# Patient Record
Sex: MALE | Race: BLACK OR AFRICAN AMERICAN | NOT HISPANIC OR LATINO | Employment: PART TIME | RURAL
[De-identification: names, ages, dates, MRNs, and addresses within clinical notes are randomized per-mention and may not be internally consistent; named-entity substitution may affect disease eponyms.]

---

## 2017-04-21 ENCOUNTER — CONVERSION ENCOUNTER (OUTPATIENT)
Dept: FAMILY MEDICINE CLINIC | Facility: CLINIC | Age: 32
End: 2017-04-21

## 2017-04-22 LAB
ALBUMIN SERPL-MCNC: 4.7 G/DL (ref 3.6–5.1)
ALP SERPL-CCNC: 55 U/L (ref 40–115)
AST SERPL-CCNC: 20 U/L (ref 10–40)
BILIRUB SERPL-MCNC: 0.5 MG/DL (ref 0.2–1.2)
BUN SERPL-MCNC: 9 MG/DL (ref 7–25)
BUN/CREAT SERPL: 9 (CALC) (ref 6–22)
CALCIUM SERPL-MCNC: 10.2 MG/DL (ref 8.6–10.2)
CHLORIDE SERPL-SCNC: 102 MMOL/L (ref 98–110)
CHOLEST SERPL-MCNC: 238 MG/DL (ref 125–200)
CONV CO2: 29 MMOL/L (ref 21–33)
CONV TOTAL PROTEIN: 8.2 G/DL (ref 6.2–8.3)
CREAT UR-MCNC: 1 MG/DL (ref 0.79–1.33)
EOSINOPHIL # BLD AUTO: 100 CELLS/UL (ref 15–500)
ERYTHROCYTE [DISTWIDTH] IN BLOOD BY AUTOMATED COUNT: 13.8 % (ref 11–15)
GLOBULIN UR ELPH-MCNC: 3.5 MG/DL (ref 2.1–3.7)
GLUCOSE UR QL: 94 MG/DL (ref 65–99)
HCT VFR BLD AUTO: 49.1 % (ref 38.5–50)
HDLC SERPL-MCNC: 45 MG/DL
HGB BLD-MCNC: 16.2 G/DL (ref 13.2–17.1)
INSULIN SERPL-ACNC: 1.3 (CALC) (ref 1–2.1)
LDLC SERPL CALC-MCNC: 169 MG/DL
LYMPHOCYTES # BLD AUTO: 2000 CELLS/UL (ref 850–3900)
LYMPHOCYTES NFR BLD AUTO: 39 %
MCH RBC QN AUTO: 26.2 PG (ref 27–33)
MCHC RBC AUTO-ENTMCNC: 33.1 G/DL (ref 32–36)
MCV RBC AUTO: 79 FL (ref 80–100)
MONOCYTES # BLD AUTO: 400 CELLS/UL (ref 200–950)
MONOCYTES NFR BLD AUTO: 9 %
NEUTROPHILS # BLD AUTO: 2500 CELLS/UL (ref 1500–7800)
NEUTROPHILS NFR BLD AUTO: 50 %
PLATELET # BLD AUTO: 197 10*3/UL (ref 140–400)
PMV BLD AUTO: 9.8 FL (ref 7.5–11.5)
POTASSIUM SERPL-SCNC: 4.4 MMOL/L (ref 3.5–5.3)
RBC # BLD AUTO: 6.21 MILLION/UL (ref 4.2–5.8)
SODIUM SERPL-SCNC: 138 MMOL/L (ref 135–146)
TRIGL SERPL-MCNC: 118 MG/DL
TSH SERPL-ACNC: 2.56 MIU/L (ref 0.4–4.5)
WBC # BLD AUTO: 5 10*3/UL (ref 3.8–10.8)

## 2019-02-12 ENCOUNTER — CONVERSION ENCOUNTER (OUTPATIENT)
Dept: FAMILY MEDICINE CLINIC | Facility: CLINIC | Age: 34
End: 2019-02-12

## 2019-02-13 LAB
ALBUMIN SERPL-MCNC: 5.1 G/DL (ref 3.6–5.1)
ALP SERPL-CCNC: 58 U/L (ref 40–115)
ALT SERPL-CCNC: 14 U/L (ref 9–46)
AST SERPL-CCNC: 19 U/L (ref 10–40)
BASOPHILS # BLD AUTO: 22 CELLS/UL (ref 0–200)
BASOPHILS NFR BLD AUTO: 0.5 %
BILIRUB SERPL-MCNC: 0.7 MG/DL (ref 0.2–1.2)
BUN SERPL-MCNC: 10 MG/DL (ref 7–25)
BUN/CREAT SERPL: ABNORMAL (CALC) (ref 6–22)
CALCIUM SERPL-MCNC: 10.3 MG/DL (ref 8.6–10.3)
CHLORIDE SERPL-SCNC: 101 MMOL/L (ref 98–110)
CHOLEST SERPL-MCNC: 195 MG/DL
CHOLEST/HDLC SERPL: 4.6 (CALC)
CONV CO2: 33 MMOL/L (ref 20–32)
CONV TOTAL PROTEIN: 8.3 G/DL (ref 6.1–8.1)
CREAT UR-MCNC: 0.94 MG/DL (ref 0.6–1.35)
EOSINOPHIL # BLD AUTO: 0.9 %
EOSINOPHIL # BLD AUTO: 40 CELLS/UL (ref 15–500)
ERYTHROCYTE [DISTWIDTH] IN BLOOD BY AUTOMATED COUNT: 13.4 % (ref 11–15)
GLOBULIN UR ELPH-MCNC: 3.2 MG/DL (ref 1.9–3.7)
GLUCOSE UR QL: 79 MG/DL (ref 65–99)
HCT VFR BLD AUTO: 46.4 % (ref 38.5–50)
HDLC SERPL-MCNC: 42 MG/DL
HGB BLD-MCNC: 15.4 G/DL (ref 13.2–17.1)
INSULIN SERPL-ACNC: 1.6 (CALC) (ref 1–2.5)
LDLC SERPL CALC-MCNC: 136 MG/DL
LYMPHOCYTES # BLD AUTO: 2015 CELLS/UL (ref 850–3900)
LYMPHOCYTES NFR BLD AUTO: 45.8 %
MCH RBC QN AUTO: 26.7 PG (ref 27–33)
MCHC RBC AUTO-ENTMCNC: 33.2 G/DL (ref 32–36)
MCV RBC AUTO: 80.4 FL (ref 80–100)
MONOCYTES # BLD AUTO: 392 CELLS/UL (ref 200–950)
MONOCYTES NFR BLD AUTO: 8.9 %
NEUTROPHILS # BLD AUTO: 1932 CELLS/UL (ref 1500–7800)
NEUTROPHILS NFR BLD AUTO: 43.9 %
NONHDLC SERPL-MCNC: 153 MG/DL
PLATELET # BLD AUTO: 168 10*3/UL (ref 140–400)
PMV BLD AUTO: 13.2 FL (ref 7.5–12.5)
POTASSIUM SERPL-SCNC: 4.2 MMOL/L (ref 3.5–5.3)
RBC # BLD AUTO: 5.77 MILLION/UL (ref 4.2–5.8)
SODIUM SERPL-SCNC: 139 MMOL/L (ref 135–146)
TRIGL SERPL-MCNC: 71 MG/DL
WBC # BLD AUTO: 4.4 10*3/UL (ref 3.8–10.8)

## 2019-02-19 ENCOUNTER — HOSPITAL ENCOUNTER (OUTPATIENT)
Dept: OTHER | Facility: HOSPITAL | Age: 34
Discharge: HOME | End: 2019-02-19
Attending: FAMILY MEDICINE | Admitting: FAMILY MEDICINE

## 2019-05-28 ENCOUNTER — CONVERSION ENCOUNTER (OUTPATIENT)
Dept: FAMILY MEDICINE CLINIC | Facility: CLINIC | Age: 34
End: 2019-05-28

## 2019-05-29 LAB
ALBUMIN SERPL-MCNC: 4.7 G/DL (ref 3.6–5.1)
ALP SERPL-CCNC: 63 U/L (ref 40–115)
ALT SERPL-CCNC: 10 U/L (ref 9–46)
AST SERPL-CCNC: 16 U/L (ref 10–40)
BASOPHILS # BLD AUTO: 21 CELLS/UL (ref 0–200)
BASOPHILS NFR BLD AUTO: 0.7 %
BILIRUB SERPL-MCNC: 0.8 MG/DL (ref 0.2–1.2)
BUN SERPL-MCNC: 8 MG/DL (ref 7–25)
BUN/CREAT SERPL: NORMAL (CALC) (ref 6–22)
CALCIUM SERPL-MCNC: 9.7 MG/DL (ref 8.6–10.3)
CHLORIDE SERPL-SCNC: 102 MMOL/L (ref 98–110)
CHOLEST SERPL-MCNC: 168 MG/DL
CHOLEST/HDLC SERPL: 4.1 (CALC)
CONV CO2: 31 MMOL/L (ref 20–32)
CONV TOTAL PROTEIN: 7.7 G/DL (ref 6.1–8.1)
CREAT UR-MCNC: 0.98 MG/DL (ref 0.6–1.35)
EOSINOPHIL # BLD AUTO: 1.3 %
EOSINOPHIL # BLD AUTO: 39 CELLS/UL (ref 15–500)
ERYTHROCYTE [DISTWIDTH] IN BLOOD BY AUTOMATED COUNT: 13.4 % (ref 11–15)
GLOBULIN UR ELPH-MCNC: 3 MG/DL (ref 1.9–3.7)
GLUCOSE UR QL: 85 MG/DL (ref 65–99)
HCT VFR BLD AUTO: 44.3 % (ref 38.5–50)
HDLC SERPL-MCNC: 41 MG/DL
HGB BLD-MCNC: 14.4 G/DL (ref 13.2–17.1)
INSULIN SERPL-ACNC: 1.6 (CALC) (ref 1–2.5)
LDLC SERPL CALC-MCNC: 109 MG/DL
LYMPHOCYTES # BLD AUTO: 1347 CELLS/UL (ref 850–3900)
LYMPHOCYTES NFR BLD AUTO: 44.9 %
MCH RBC QN AUTO: 26.3 PG (ref 27–33)
MCHC RBC AUTO-ENTMCNC: 32.5 G/DL (ref 32–36)
MCV RBC AUTO: 80.8 FL (ref 80–100)
MONOCYTES # BLD AUTO: 300 CELLS/UL (ref 200–950)
MONOCYTES NFR BLD AUTO: 10 %
NEUTROPHILS # BLD AUTO: 1293 CELLS/UL (ref 1500–7800)
NEUTROPHILS NFR BLD AUTO: 43.1 %
NONHDLC SERPL-MCNC: 127 MG/DL
PLATELET # BLD AUTO: 185 10*3/UL (ref 140–400)
PMV BLD AUTO: 12.1 FL (ref 7.5–12.5)
POTASSIUM SERPL-SCNC: 3.8 MMOL/L (ref 3.5–5.3)
RBC # BLD AUTO: 5.48 MILLION/UL (ref 4.2–5.8)
SODIUM SERPL-SCNC: 139 MMOL/L (ref 135–146)
TRIGL SERPL-MCNC: 90 MG/DL
WBC # BLD AUTO: 3 10*3/UL (ref 3.8–10.8)

## 2019-06-25 ENCOUNTER — CONSULT (OUTPATIENT)
Dept: ONCOLOGY | Facility: CLINIC | Age: 34
End: 2019-06-25

## 2019-06-25 ENCOUNTER — LAB (OUTPATIENT)
Dept: LAB | Facility: HOSPITAL | Age: 34
End: 2019-06-25

## 2019-06-25 VITALS
WEIGHT: 140.6 LBS | TEMPERATURE: 97.7 F | RESPIRATION RATE: 18 BRPM | DIASTOLIC BLOOD PRESSURE: 84 MMHG | SYSTOLIC BLOOD PRESSURE: 144 MMHG | HEIGHT: 68 IN | HEART RATE: 59 BPM | BODY MASS INDEX: 21.31 KG/M2

## 2019-06-25 DIAGNOSIS — R06.00 DYSPNEA, UNSPECIFIED TYPE: ICD-10-CM

## 2019-06-25 DIAGNOSIS — D70.8 OTHER NEUTROPENIA (HCC): Primary | ICD-10-CM

## 2019-06-25 DIAGNOSIS — D70.8 OTHER NEUTROPENIA (HCC): ICD-10-CM

## 2019-06-25 DIAGNOSIS — R22.2 SUPRACLAVICULAR FOSSA FULLNESS: ICD-10-CM

## 2019-06-25 PROBLEM — R06.02 SHORTNESS OF BREATH: Status: ACTIVE | Noted: 2019-06-25

## 2019-06-25 LAB
ALBUMIN SERPL-MCNC: 4.8 G/DL (ref 3.5–4.8)
ALBUMIN/GLOB SERPL: 1.7 G/DL (ref 1–1.7)
ALP SERPL-CCNC: 53 U/L (ref 32–91)
ALT SERPL W P-5'-P-CCNC: 11 U/L (ref 17–63)
ANION GAP SERPL CALCULATED.3IONS-SCNC: 10 MMOL/L (ref 10–20)
AST SERPL-CCNC: 18 U/L (ref 15–41)
BASOPHILS # BLD AUTO: 0.02 10*3/MM3 (ref 0–0.2)
BASOPHILS NFR BLD AUTO: 0.5 % (ref 0–1.5)
BILIRUB SERPL-MCNC: 0.7 MG/DL (ref 0.3–1.2)
BUN BLD-MCNC: 6 MG/DL (ref 8–20)
BUN/CREAT SERPL: 6.7 (ref 6.2–20.3)
CALCIUM SPEC-SCNC: 9.5 MG/DL (ref 8.9–10.3)
CHLORIDE SERPL-SCNC: 102 MMOL/L (ref 101–111)
CO2 SERPL-SCNC: 26 MMOL/L (ref 22–32)
CREAT BLD-MCNC: 0.9 MG/DL (ref 0.7–1.2)
DEPRECATED RDW RBC AUTO: 36.5 FL (ref 37–54)
EOSINOPHIL # BLD AUTO: 0.04 10*3/MM3 (ref 0–0.4)
EOSINOPHIL NFR BLD AUTO: 0.9 % (ref 0.3–6.2)
ERYTHROCYTE [DISTWIDTH] IN BLOOD BY AUTOMATED COUNT: 12.7 % (ref 12.3–15.4)
FERRITIN SERPL-MCNC: 90 NG/ML (ref 24–336)
FOLATE SERPL-MCNC: 17 NG/ML (ref 5.9–24.8)
GFR SERPL CREATININE-BSD FRML MDRD: 118 ML/MIN/1.73
GLOBULIN UR ELPH-MCNC: 2.9 GM/DL (ref 2.5–3.8)
GLUCOSE BLD-MCNC: 76 MG/DL (ref 65–99)
HCT VFR BLD AUTO: 41.5 % (ref 37.5–51)
HGB BLD-MCNC: 13.9 G/DL (ref 13–17.7)
LDH SERPL-CCNC: 93 U/L (ref 98–192)
LYMPHOCYTES # BLD AUTO: 1.48 10*3/MM3 (ref 0.7–3.1)
LYMPHOCYTES NFR BLD AUTO: 34.2 % (ref 19.6–45.3)
MCH RBC QN AUTO: 26.5 PG (ref 26.6–33)
MCHC RBC AUTO-ENTMCNC: 33.5 G/DL (ref 31.5–35.7)
MCV RBC AUTO: 79.2 FL (ref 79–97)
MONOCYTES # BLD AUTO: 0.39 10*3/MM3 (ref 0.1–0.9)
MONOCYTES NFR BLD AUTO: 9 % (ref 5–12)
NEUTROPHILS # BLD AUTO: 2.4 10*3/MM3 (ref 1.7–7)
NEUTROPHILS NFR BLD AUTO: 55.4 % (ref 42.7–76)
PATHOLOGY REVIEW: YES
PLATELET # BLD AUTO: 176 10*3/MM3 (ref 140–450)
PMV BLD AUTO: 11 FL (ref 6–12)
POTASSIUM BLD-SCNC: 3.3 MMOL/L (ref 3.6–5.1)
PROT SERPL-MCNC: 7.7 G/DL (ref 6.1–7.9)
RBC # BLD AUTO: 5.24 10*6/MM3 (ref 4.14–5.8)
RETICS # AUTO: 0.04 10*6/MM3 (ref 0.02–0.13)
RETICS/RBC NFR AUTO: 0.73 % (ref 0.7–1.9)
SODIUM BLD-SCNC: 138 MMOL/L (ref 136–144)
VIT B12 BLD-MCNC: 259 PG/ML (ref 180–914)
WBC NRBC COR # BLD: 4.33 10*3/MM3 (ref 3.4–10.8)

## 2019-06-25 PROCEDURE — 82746 ASSAY OF FOLIC ACID SERUM: CPT | Performed by: INTERNAL MEDICINE

## 2019-06-25 PROCEDURE — 85045 AUTOMATED RETICULOCYTE COUNT: CPT | Performed by: INTERNAL MEDICINE

## 2019-06-25 PROCEDURE — 82728 ASSAY OF FERRITIN: CPT | Performed by: INTERNAL MEDICINE

## 2019-06-25 PROCEDURE — 83010 ASSAY OF HAPTOGLOBIN QUANT: CPT | Performed by: INTERNAL MEDICINE

## 2019-06-25 PROCEDURE — 99205 OFFICE O/P NEW HI 60 MIN: CPT | Performed by: INTERNAL MEDICINE

## 2019-06-25 PROCEDURE — 83615 LACTATE (LD) (LDH) ENZYME: CPT | Performed by: INTERNAL MEDICINE

## 2019-06-25 PROCEDURE — 36415 COLL VENOUS BLD VENIPUNCTURE: CPT

## 2019-06-25 PROCEDURE — 82607 VITAMIN B-12: CPT | Performed by: INTERNAL MEDICINE

## 2019-06-25 PROCEDURE — 85025 COMPLETE CBC W/AUTO DIFF WBC: CPT | Performed by: INTERNAL MEDICINE

## 2019-06-25 NOTE — PROGRESS NOTES
Hematology/Oncology Outpatient Consultation    Colton Garland  1985    Primary Care Physician: Matthew Lopez MD  Referring Physician: Matthew Lopez MD  Reason For Consult:  leucopenia    Chief Complaint   Patient presents with   • Appointment     New patient with leukopenia       History of Present Illness:  This is a 34-year-old male who has been referred secondary to leukopenia patient is aware of leukopenia on and off for the past 1 year.  He denies any history of repeated infections.  He denies night sweats, weight loss or fatigue symptoms.  He remains fully functional.  He denies any significant pain issues.  He has no prior history of hematologic problems.  He has never received blood transfusions in the past.  There is no family history of hematological issues.  Patient is originally from [place].  He travels back and forth and reports repeated bouts of malaria which each visit.  He denies any fevers or chills muscle aches or pains.  He denies any lumps in any part of his body.  Patient has been in the [place] now for 3 years.  He is accompanied today by his spouse for this appointment.    The patient had a complete metabolic panel 5/29/2019 that was normal.  He had a CBC also on 5/29/2019 his white count was 3 hemoglobin was 14.4, platelets were 1 85,000.  His differentials with a ANC of 1.244% lymphocytes normal there was no monocytosis eosinophilia or basophilia there was no evidence of leukoerythroblastic changes on his a differential counts.  He had a CBC on 2/12/2019 his white count was 4.4, hemoglobin was 15.4, and platelets where 168,000.  His ANC was 1.9 there was no monocytosis is no failure or basophilia.       Past Medical History:   Diagnosis Date   • Chest pain    • Hypertension    • Leukopenia    • Malaria    • Wrist pain        History reviewed. No pertinent surgical history.      Current Outpatient Medications:   •  Naproxen Sodium (ALEVE PO), Take  by mouth As Needed., Disp: ,  "Rfl:     No Known Allergies      There is no immunization history on file for this patient.    History reviewed. No pertinent family history.    Cancer-related family history is not on file.    Social History     Tobacco Use   • Smoking status: Never Smoker   Substance Use Topics   • Alcohol use: No     Frequency: Never   • Drug use: No       ROS:    Review of Systems   Constitutional: Negative for chills and fever.   HENT: Negative for ear pain, mouth sores, nosebleeds and sore throat.    Eyes: Negative for photophobia and visual disturbance.   Respiratory: Positive for shortness of breath. Negative for wheezing and stridor.    Cardiovascular: Negative for chest pain and palpitations.   Gastrointestinal: Negative for abdominal pain, diarrhea, nausea and vomiting.   Endocrine: Negative for cold intolerance and heat intolerance.   Genitourinary: Negative for dysuria and hematuria.   Musculoskeletal: Negative for joint swelling and neck stiffness.   Skin: Negative for color change and rash.   Neurological: Negative for seizures and syncope.   Hematological: Negative for adenopathy.        No obvious bleeding   Psychiatric/Behavioral: Negative for agitation, confusion and hallucinations.       Objective:    Vitals:    06/25/19 0841   BP: 144/84   Pulse: 59   Resp: 18   Temp: 97.7 °F (36.5 °C)   Weight: 63.8 kg (140 lb 9.6 oz)   Height: 172.7 cm (68\")   PainSc: 0-No pain       ECOG  (0) Fully active, able to carry on all predisease performance without restriction    Physical Exam:    Physical Exam   Constitutional: He is oriented to person, place, and time. No distress.   HENT:   Head: Normocephalic and atraumatic.   Eyes: Conjunctivae and EOM are normal. Right eye exhibits no discharge. No scleral icterus.   Neck: Normal range of motion. Neck supple. No thyromegaly present.   Fullness of right supraclavicular area measuring 3cm   Cardiovascular: Normal rate, regular rhythm and normal heart sounds. Exam reveals no " gallop and no friction rub.   Pulmonary/Chest: Effort normal. No stridor. No respiratory distress. He has no wheezes.   Abdominal: Soft. Bowel sounds are normal. He exhibits no mass. There is no tenderness. There is no rebound and no guarding.   Musculoskeletal: Normal range of motion. He exhibits no tenderness.   Lymphadenopathy:     He has no cervical adenopathy.   Neurological: He is alert and oriented to person, place, and time. He exhibits normal muscle tone.   Skin: Skin is warm. No rash noted. He is not diaphoretic. No erythema.   Psychiatric: He has a normal mood and affect. His behavior is normal.   Nursing note and vitals reviewed.  He does not have any other palpable masses or lymphadenopathy.    RECENT LABS  WBC   Date Value Ref Range Status   03/22/2019 4.9 4.5 - 11.5 10*3/uL Final     RBC   Date Value Ref Range Status   03/22/2019 5.63 4.60 - 6.00 10*6/uL Final   02/12/2019 5.77 4.20 - 5.80 Million/uL Final     Hemoglobin   Date Value Ref Range Status   03/22/2019 15.4 14.0 - 18.0 g/dL Final     Hematocrit   Date Value Ref Range Status   03/22/2019 46.0 40 - 54 % Final     MCV   Date Value Ref Range Status   03/22/2019 81.7 80 - 94 fL Final     MCH   Date Value Ref Range Status   03/22/2019 27.3 26 - 32 pg Final     MCHC   Date Value Ref Range Status   03/22/2019 33.5 32 - 36 g/dL Final     RDW   Date Value Ref Range Status   03/22/2019 13.5 11.5 - 14.5 % Final     MPV   Date Value Ref Range Status   03/22/2019 9.6 7.4 - 10.4 fL Final     Platelets   Date Value Ref Range Status   03/22/2019 167 150 - 450 10*3/uL Final     Neutrophil Rel %   Date Value Ref Range Status   03/22/2019 52 50 - 75 % Final     Lymphocyte Rel %   Date Value Ref Range Status   03/22/2019 38 18 - 42 % Final     Monocyte Rel %   Date Value Ref Range Status   03/22/2019 8 2 - 11 % Final     Eosinophil Rel %   Date Value Ref Range Status   03/22/2019 1 0 - 3 % Final     Basophil Rel %   Date Value Ref Range Status   03/22/2019 1 0  - 2 % Final     Neutrophils Absolute   Date Value Ref Range Status   03/22/2019 2.6 2.3 - 8.6 10*3/uL Final     Lymphocytes Absolute   Date Value Ref Range Status   03/22/2019 1.9 0.8 - 4.8 10*3/uL Final     Monocytes Absolute   Date Value Ref Range Status   03/22/2019 0.4 0.1 - 1.3 10*3/uL Final     Eosinophils Absolute   Date Value Ref Range Status   03/22/2019 0.0 0.0 - 0.3 10*3/uL Final     Basophils Absolute   Date Value Ref Range Status   03/22/2019 0.0 0 - 0.2 10*3/uL Final     nRBC   Date Value Ref Range Status   03/22/2019 0 0 /100[WBCs] Final       Lab Results   Component Value Date    GLUCOSE 81 03/22/2019    BUN 7 (L) 03/22/2019    CREATININE 0.9 03/22/2019    EGFRIFNONA 106 02/12/2019    EGFRIFAFRI 123 02/12/2019    BCR 7.8 03/22/2019    K 3.8 03/23/2019    CO2 23 03/22/2019    CALCIUM 9.5 03/22/2019    ALBUMIN 4.7 03/22/2019    LABIL2 1.7 03/22/2019    AST  03/22/2019     34 Hemolyzed specimen. Results may be falsely elevated.    ALT  03/22/2019     17 Hemolyzed specimen. Results may be falsely elevated.         Assessment/Plan      1. Leukopenia   2.  Right supraclavicular fullness of unclear etiology may be lymph node enlargement or mass.  Patient denies history of trauma that can account for the deformity.  3.  Dyspnea, chronic      Plans    Patient has leukopenia of unclear etiology.  He reports intermittent decreases in his white count.  Review of available lab data does not show any evidence of leukoerythroblastic changes to suggest a primary bone marrow disease.  We reviewed the possible differential diagnoses including autoimmune, vitamin deficiencies, infectious etiologies included chronic viral infections, and finally primary bone marrow disease is also in the differential such as chronic leukemias.    He has right supraclavicular fullness of unclear etiology.  He denies any significant trauma in the past that can account for the asymmetry.  Would like to further investigate with a CT scan of  the neck.  Chest CT has also been included due to his chronic intermittent dyspnea to determine if all of these are correlates.    Labs and x-rays have been ordered today to further investigate.  I have reviewed the possible differential diagnosis with patient and his spouse is present.  I will plan to see him back in 2 weeks to review the above results and to make further recommendations  I have reviewed labs results, imaging, vitals, and medications with the patient today.       Patient verbalized understanding and is in agreement of the above plan.  He has been given upper chances of aspiration which have all been answered to the best of my abilities    Thank you very much for allowing me participate in the care of Colton.  I will keep you updated on his progress.

## 2019-06-25 NOTE — PROGRESS NOTES
Jumana,     Please call patient and advise to instruct him he needs to start KCl 10 mEq once daily.     I can not escribe Rx because nobody input the pharmacy information.      Lab Results       Component                Value               Date                       K                        3.3 (L)             06/25/2019              Electronically signed by JEISON Almendarez, 06/25/19, 4:47 PM.

## 2019-06-26 ENCOUNTER — TELEPHONE (OUTPATIENT)
Dept: ONCOLOGY | Facility: CLINIC | Age: 34
End: 2019-06-26

## 2019-06-26 LAB
ALBUMIN SERPL-MCNC: 4.5 G/DL (ref 2.9–4.4)
ALBUMIN/GLOB SERPL: 1.3 {RATIO} (ref 0.7–1.7)
ALPHA1 GLOB FLD ELPH-MCNC: 0.3 G/DL (ref 0–0.4)
ALPHA2 GLOB SERPL ELPH-MCNC: 0.6 G/DL (ref 0.4–1)
ANA SER QL: NEGATIVE
B-GLOBULIN SERPL ELPH-MCNC: 1 G/DL (ref 0.7–1.3)
GAMMA GLOB SERPL ELPH-MCNC: 1.8 G/DL (ref 0.4–1.8)
GLOBULIN SER CALC-MCNC: 3.6 G/DL (ref 2.2–3.9)
HAPTOGLOB SERPL-MCNC: 30 MG/DL (ref 36–195)
IGA SERPL-MCNC: 170 MG/DL (ref 90–386)
IGG SERPL-MCNC: 1598 MG/DL (ref 700–1600)
IGM SERPL-MCNC: 116 MG/DL (ref 20–172)
INTERPRETATION SERPL IEP-IMP: ABNORMAL
Lab: ABNORMAL
M-SPIKE: ABNORMAL G/DL
PROT SERPL-MCNC: 8.1 G/DL (ref 6–8.5)

## 2019-06-26 RX ORDER — POTASSIUM CHLORIDE 750 MG/1
10 TABLET, FILM COATED, EXTENDED RELEASE ORAL DAILY
Qty: 30 TABLET | Refills: 3 | Status: SHIPPED | OUTPATIENT
Start: 2019-06-26 | End: 2019-07-03 | Stop reason: SDUPTHER

## 2019-06-26 NOTE — TELEPHONE ENCOUNTER
----- Message from JEISON Almendarez sent at 6/25/2019  5:03 PM EDT -----  Jumana,     Please call patient and advise to instruct him he needs to start KCl 10 mEq once daily.     I can not escribe Rx because nobody input the pharmacy information.      Lab Results       Component                Value               Date                       K                        3.3 (L)             06/25/2019              Electronically signed by JEISON Almendarez, 06/25/19, 4:47 PM.

## 2019-06-26 NOTE — TELEPHONE ENCOUNTER
I called and spoke to pt's wife. I let her know that pt's K+ level is low so NP would like pt to start KCL 10 meq daily. She showed v/u. Rx was escribed to [facility] in [place].

## 2019-06-27 LAB
LAB AP CASE REPORT: NORMAL
Lab: NORMAL
METHYLMALONATE SERPL-SCNC: 90 NMOL/L (ref 0–378)
PATH REPORT.FINAL DX SPEC: NORMAL

## 2019-06-28 ENCOUNTER — TELEPHONE (OUTPATIENT)
Dept: ONCOLOGY | Facility: CLINIC | Age: 34
End: 2019-06-28

## 2019-06-28 NOTE — TELEPHONE ENCOUNTER
Received call from patient stating he went to pharmacy to  [facility] and it was not there.  Chart reviewed.  6/26/19 Poonam Miguel NP ordered KCL 10 meq daily.  stormy Johnson

## 2019-07-03 RX ORDER — POTASSIUM CHLORIDE 750 MG/1
10 TABLET, FILM COATED, EXTENDED RELEASE ORAL DAILY
Qty: 30 TABLET | Refills: 3 | Status: SHIPPED | OUTPATIENT
Start: 2019-07-03

## 2019-07-10 NOTE — PROGRESS NOTES
Hematology/Oncology Outpatient Follow up Note    Colton Garland  1985    Primary Care Physician: Matthew Lopez MD  Referring Physician: Matthew Lopez MD  Reason For Consult:  leucopenia    Chief Complaint   Patient presents with   • Follow-up     Leukopenia   • Follow-up     Right supraclavicular fullness of unclear etiology may be lymph node enlargement or mass   • Follow-up     Dyspnea, chronic     Subjective: Recent is here today for follow-up he has no specific complaints.  He is accompanied today by his spouse for this appointment.  He is yet to have his a CT of the chest which is now scheduled for July 18 2019.  New issues since her last visit.  He has not had any infectious issues as well.  He remains fully functional.        History of Present Illness:  This is a 34-year-old male who has been referred secondary to leukopenia patient is aware of leukopenia on and off for the past 1 year.  He denies any history of repeated infections.  He denies night sweats, weight loss or fatigue symptoms.  He remains fully functional.  He denies any significant pain issues.  He has no prior history of hematologic problems.  He has never received blood transfusions in the past.  There is no family history of hematological issues.  Patient is originally from [place].  He travels back and forth and reports repeated bouts of malaria which each visit.  He denies any fevers or chills muscle aches or pains.  He denies any lumps in any part of his body.  Patient has been in the [place] now for 3 years.    · The patient had a complete metabolic panel 5/29/2019 that was normal.  He had a CBC also on 5/29/2019 his white count was 3 hemoglobin was 14.4, platelets were 1 85,000.  His differentials with a ANC of 1.244% lymphocytes normal there was no monocytosis eosinophilia or basophilia there was no evidence of leukoerythroblastic changes on his a differential counts.  He had a CBC on 2/12/2019 his white count was 4.4,  hemoglobin was 15.4, and platelets where 168,000.  His ANC was 1.9 there was no monocytosis is no failure or basophilia.   · 6/25/2019 patient had labs including a chemistry panel his potassium was 3.3.  His BUN was 6 and creatinine 0.9.  She had normal liver function tests.  Ferritin was 90, folate was 17 methylmalonic acid level was normal at 90 haptoglobin was low at 30, B12 was 259.  SPEP with PATT did not show any monoclonal protein.  Peripheral smear did not show any immature forms.  His ANC was 2.4, white count 4.3 hemoglobin 13.9, and platelets where 176.  Patient had normal quantitative immunoglobulins for IgA IgG and IgM.  His BRISA was negative.        Past Medical History:   Diagnosis Date   • Chest pain    • Hypertension    • Leukopenia    • Malaria    • Wrist pain        No past surgical history on file.      Current Outpatient Medications:   •  Naproxen Sodium (ALEVE PO), Take  by mouth As Needed., Disp: , Rfl:   •  potassium chloride (K-DUR) 10 MEQ CR tablet, Take 1 tablet by mouth Daily., Disp: 30 tablet, Rfl: 3    No Known Allergies      There is no immunization history on file for this patient.    No family history on file.    Cancer-related family history is not on file.    Social History     Tobacco Use   • Smoking status: Never Smoker   Substance Use Topics   • Alcohol use: No     Frequency: Never   • Drug use: No       ROS:    Review of Systems   Constitutional: Negative for chills and fever.   HENT: Negative for ear pain, mouth sores, nosebleeds and sore throat.    Eyes: Negative for photophobia and visual disturbance.   Respiratory: Negative for shortness of breath, wheezing and stridor.    Cardiovascular: Negative for chest pain and palpitations.   Gastrointestinal: Negative for abdominal pain, diarrhea, nausea and vomiting.   Endocrine: Negative for cold intolerance and heat intolerance.   Genitourinary: Negative for dysuria and hematuria.   Musculoskeletal: Negative for joint swelling and  "neck stiffness.   Skin: Negative for color change and rash.   Neurological: Negative for seizures and syncope.   Hematological: Negative for adenopathy.        No obvious bleeding   Psychiatric/Behavioral: Negative for agitation, confusion and hallucinations.       Objective:    Vitals:    07/11/19 0936   BP: 154/93   Pulse: 82   Resp: 15   Temp: 97.6 °F (36.4 °C)   Weight: 64.6 kg (142 lb 6.4 oz)   Height: 165.1 cm (65\")   PainSc: 0-No pain       ECOG  (0) Fully active, able to carry on all predisease performance without restriction    Physical Exam:    Physical Exam   Constitutional: He is oriented to person, place, and time. No distress.   HENT:   Head: Normocephalic and atraumatic.   Eyes: Conjunctivae and EOM are normal. Right eye exhibits no discharge. Left eye exhibits no discharge. No scleral icterus.   Neck: Normal range of motion. Neck supple. No thyromegaly present.   Fullness of right supraclavicular area measuring 3cm   Cardiovascular: Normal rate, regular rhythm and normal heart sounds. Exam reveals no gallop and no friction rub.   Pulmonary/Chest: Effort normal. No stridor. No respiratory distress. He has no wheezes.   Abdominal: Soft. Bowel sounds are normal. He exhibits no mass. There is no tenderness. There is no rebound and no guarding.   Musculoskeletal: Normal range of motion. He exhibits no tenderness.   Lymphadenopathy:     He has no cervical adenopathy.   Neurological: He is alert and oriented to person, place, and time. He exhibits normal muscle tone.   Skin: Skin is warm. No rash noted. He is not diaphoretic. No erythema.   Psychiatric: He has a normal mood and affect. His behavior is normal.   Nursing note and vitals reviewed.  He does not have any other palpable masses or lymphadenopathy.    RECENT LABS  WBC   Date Value Ref Range Status   07/11/2019 4.64 3.40 - 10.80 10*3/mm3 Final     RBC   Date Value Ref Range Status   07/11/2019 5.41 4.14 - 5.80 10*6/mm3 Final   02/12/2019 5.77 4.20 - " 5.80 Million/uL Final     Hemoglobin   Date Value Ref Range Status   07/11/2019 14.4 13.0 - 17.7 g/dL Final     Hematocrit   Date Value Ref Range Status   07/11/2019 43.1 37.5 - 51.0 % Final     MCV   Date Value Ref Range Status   07/11/2019 79.7 79.0 - 97.0 fL Final     MCH   Date Value Ref Range Status   07/11/2019 26.6 26.6 - 33.0 pg Final     MCHC   Date Value Ref Range Status   07/11/2019 33.4 31.5 - 35.7 g/dL Final     RDW   Date Value Ref Range Status   07/11/2019 13.2 12.3 - 15.4 % Final     RDW-SD   Date Value Ref Range Status   07/11/2019 37.7 37.0 - 54.0 fl Final     MPV   Date Value Ref Range Status   07/11/2019 11.8 6.0 - 12.0 fL Final     Platelets   Date Value Ref Range Status   07/11/2019 159 140 - 450 10*3/mm3 Final     Neutrophil %   Date Value Ref Range Status   07/11/2019 41.8 (L) 42.7 - 76.0 % Final     Lymphocyte %   Date Value Ref Range Status   07/11/2019 46.8 (H) 19.6 - 45.3 % Final     Monocyte %   Date Value Ref Range Status   07/11/2019 9.9 5.0 - 12.0 % Final     Eosinophil %   Date Value Ref Range Status   07/11/2019 1.3 0.3 - 6.2 % Final     Basophil %   Date Value Ref Range Status   07/11/2019 0.2 0.0 - 1.5 % Final     Neutrophils, Absolute   Date Value Ref Range Status   07/11/2019 1.94 1.70 - 7.00 10*3/mm3 Final     Lymphocytes, Absolute   Date Value Ref Range Status   07/11/2019 2.17 0.70 - 3.10 10*3/mm3 Final     Monocytes, Absolute   Date Value Ref Range Status   07/11/2019 0.46 0.10 - 0.90 10*3/mm3 Final     Eosinophils, Absolute   Date Value Ref Range Status   07/11/2019 0.06 0.00 - 0.40 10*3/mm3 Final     Basophils, Absolute   Date Value Ref Range Status   07/11/2019 0.01 0.00 - 0.20 10*3/mm3 Final     nRBC   Date Value Ref Range Status   03/22/2019 0 0 /100[WBCs] Final       Lab Results   Component Value Date    GLUCOSE 76 06/25/2019    BUN 6 (L) 06/25/2019    CREATININE 0.90 06/25/2019    EGFRIFNONA 106 02/12/2019    EGFRIFAFRI 118 06/25/2019    BCR 6.7 06/25/2019    K 3.3  (L) 06/25/2019    CO2 26.0 06/25/2019    CALCIUM 9.5 06/25/2019    PROTENTOTREF 8.1 06/25/2019    ALBUMIN 4.80 06/25/2019    ALBUMIN 4.5 (H) 06/25/2019    LABIL2 1.3 06/25/2019    AST 18 06/25/2019    ALT 11 (L) 06/25/2019         Assessment/Plan      1.  Intermittent leukopenia .  His work-up has been negative and his repeat CBCs have been essentially unremarkable.  Peripheral smear does not show any evidence of leukoerythroblastic changes.  BRISA was also negative.  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    2.  Right supraclavicular fullness of unclear etiology may be lymph node enlargement or mass.  Patient denies history of trauma that can account for the deformity.    3.  Dyspnea, chronic. CT Neck and Chest      Plans:    Patient has leukopenia of unclear etiology.  He reports intermittent decreases in his white count.  Review of available lab data does not show any evidence of leukoerythroblastic changes to suggest a primary bone marrow disease.  We reviewed the possible differential diagnoses including autoimmune, vitamin deficiencies, infectious etiologies included chronic viral infections, and finally primary bone marrow disease is also in the differential such as chronic leukemias.  So far his work-up has been negative.  The above differentials have been ruled out for now.    He has right supraclavicular fullness of unclear etiology.  He denies any significant trauma in the past that can account for the asymmetry.  Would like to further investigate with a CT scan of the neck.  Chest CT has also been included due to his chronic intermittent dyspnea to determine if all of these are correlates.  CT scans are scheduled for July 18, 2019 .  We will review the results once available.    I have reviewed labs results, imaging( CT abdomen and pelvis), vitals, and medications with the patient today.       Patient verbalized understanding  and is in agreement of the above plan.  He has been given upper chances of aspiration which have all been answered to the best of my abilities    If his CT scans are normal then we will plan to see patient in approximately 4 months with a CBC done prior to the next visit.  He will continue to follow-up with his primary care physician.            Much of the above report is an electronic transcription//translation of the spoken language to printed text using Dragon Software. As such, the subtleties and finesse of the spoken language may permit erroneous, or at times, nonsensical words or phrases to be inadvertently transcribed; thus changes may be made at a later date to rectify these errors.

## 2019-07-11 ENCOUNTER — APPOINTMENT (OUTPATIENT)
Dept: LAB | Facility: HOSPITAL | Age: 34
End: 2019-07-11

## 2019-07-11 ENCOUNTER — OFFICE VISIT (OUTPATIENT)
Dept: ONCOLOGY | Facility: CLINIC | Age: 34
End: 2019-07-11

## 2019-07-11 VITALS
DIASTOLIC BLOOD PRESSURE: 93 MMHG | BODY MASS INDEX: 23.72 KG/M2 | TEMPERATURE: 97.6 F | HEIGHT: 65 IN | SYSTOLIC BLOOD PRESSURE: 154 MMHG | HEART RATE: 82 BPM | RESPIRATION RATE: 15 BRPM | WEIGHT: 142.4 LBS

## 2019-07-11 DIAGNOSIS — D70.8 OTHER NEUTROPENIA (HCC): Primary | ICD-10-CM

## 2019-07-11 LAB
BASOPHILS # BLD AUTO: 0.01 10*3/MM3 (ref 0–0.2)
BASOPHILS NFR BLD AUTO: 0.2 % (ref 0–1.5)
DEPRECATED RDW RBC AUTO: 37.7 FL (ref 37–54)
EOSINOPHIL # BLD AUTO: 0.06 10*3/MM3 (ref 0–0.4)
EOSINOPHIL NFR BLD AUTO: 1.3 % (ref 0.3–6.2)
ERYTHROCYTE [DISTWIDTH] IN BLOOD BY AUTOMATED COUNT: 13.2 % (ref 12.3–15.4)
HCT VFR BLD AUTO: 43.1 % (ref 37.5–51)
HGB BLD-MCNC: 14.4 G/DL (ref 13–17.7)
LYMPHOCYTES # BLD AUTO: 2.17 10*3/MM3 (ref 0.7–3.1)
LYMPHOCYTES NFR BLD AUTO: 46.8 % (ref 19.6–45.3)
MCH RBC QN AUTO: 26.6 PG (ref 26.6–33)
MCHC RBC AUTO-ENTMCNC: 33.4 G/DL (ref 31.5–35.7)
MCV RBC AUTO: 79.7 FL (ref 79–97)
MONOCYTES # BLD AUTO: 0.46 10*3/MM3 (ref 0.1–0.9)
MONOCYTES NFR BLD AUTO: 9.9 % (ref 5–12)
NEUTROPHILS # BLD AUTO: 1.94 10*3/MM3 (ref 1.7–7)
NEUTROPHILS NFR BLD AUTO: 41.8 % (ref 42.7–76)
PLATELET # BLD AUTO: 159 10*3/MM3 (ref 140–450)
PMV BLD AUTO: 11.8 FL (ref 6–12)
RBC # BLD AUTO: 5.41 10*6/MM3 (ref 4.14–5.8)
WBC NRBC COR # BLD: 4.64 10*3/MM3 (ref 3.4–10.8)

## 2019-07-11 PROCEDURE — 85025 COMPLETE CBC W/AUTO DIFF WBC: CPT | Performed by: INTERNAL MEDICINE

## 2019-07-11 PROCEDURE — 99214 OFFICE O/P EST MOD 30 MIN: CPT | Performed by: INTERNAL MEDICINE

## 2019-07-18 ENCOUNTER — APPOINTMENT (OUTPATIENT)
Dept: CT IMAGING | Facility: HOSPITAL | Age: 34
End: 2019-07-18

## 2019-09-04 LAB
REF LAB TEST METHOD: NORMAL
REF LAB TEST METHOD: NORMAL

## 2019-11-06 NOTE — PROGRESS NOTES
Hematology/Oncology Outpatient Follow Up    PATIENT NAME:Colton Garland  :1985  MRN: [id number]  PRIMARY CARE PHYSICIAN: Matthew Lopez MD  REFERRING PHYSICIAN: Matthew Lopez MD    Chief Complaint   Patient presents with   • Follow-up     Leukocytosis        HISTORY OF PRESENT ILLNESS:   This is a 34-year-old male who has been referred secondary to leukopenia patient is aware of leukopenia on and off for the past 1 year.  He denies any history of repeated infections.  He denies night sweats, weight loss or fatigue symptoms.  He remains fully functional.  He denies any significant pain issues.  He has no prior history of hematologic problems.  He has never received blood transfusions in the past.  There is no family history of hematological issues.  Patient is originally from [place].  He travels back and forth and reports repeated bouts of malaria which each visit.  He denies any fevers or chills muscle aches or pains.  He denies any lumps in any part of his body.  Patient has been in the [place] now for 3 years.     · The patient had a complete metabolic panel 2019 that was normal.  He had a CBC also on 2019 his white count was 3 hemoglobin was 14.4, platelets were 1 85,000.  His differentials with a ANC of 1.244% lymphocytes normal there was no monocytosis eosinophilia or basophilia there was no evidence of leukoerythroblastic changes on his a differential counts.  He had a CBC on 2019 his white count was 4.4, hemoglobin was 15.4, and platelets where 168,000.  His ANC was 1.9 there was no monocytosis is no failure or basophilia.   · 2019 patient had labs including a chemistry panel his potassium was 3.3.  His BUN was 6 and creatinine 0.9.  She had normal liver function tests.  Ferritin was 90, folate was 17 methylmalonic acid level was normal at 90 haptoglobin was low at 30, B12 was 259.  SPEP with PATT did not show any monoclonal protein.  Peripheral smear did not show any  immature forms.  His ANC was 2.4, white count 4.3 hemoglobin 13.9, and platelets where 176.  Patient had normal quantitative immunoglobulins for IgA IgG and IgM.  His BRISA was negative.    Past Medical History:   Diagnosis Date   • Chest pain    • Hypertension    • Leukopenia    • Malaria    • Wrist pain        No past surgical history on file.      Current Outpatient Medications:   •  Naproxen Sodium (ALEVE PO), Take  by mouth As Needed., Disp: , Rfl:   •  potassium chloride (K-DUR) 10 MEQ CR tablet, Take 1 tablet by mouth Daily., Disp: 30 tablet, Rfl: 3    No Known Allergies    No family history on file.    Cancer-related family history is not on file.    Social History     Tobacco Use   • Smoking status: Never Smoker   Substance Use Topics   • Alcohol use: No     Frequency: Never   • Drug use: No       I have reviewed the history of present illness, past medical history, family history, social history, lab results, all notes and other records since the patient was last seen on 7/11/2019.    SUBJECTIVE:  The patient is here for a follow up appointment.  The patient denies night sweats or weight loss.  He denies viral infection.  He feels well.  He denies any fevers, chills, nausea or vomiting.          REVIEW OF SYSTEMS:  Review of Systems   Constitutional: Negative for chills and fever.   HENT: Negative for ear pain, mouth sores, nosebleeds and sore throat.    Eyes: Negative for photophobia and visual disturbance.   Respiratory: Negative for wheezing and stridor.    Cardiovascular: Negative for chest pain and palpitations.   Gastrointestinal: Negative for abdominal pain, diarrhea, nausea and vomiting.   Endocrine: Negative for cold intolerance and heat intolerance.   Genitourinary: Negative for dysuria and hematuria.   Musculoskeletal: Negative for joint swelling and neck stiffness.   Skin: Negative for color change and rash.   Neurological: Negative for seizures and syncope.   Hematological: Negative for  "adenopathy.        No obvious bleeding   Psychiatric/Behavioral: Negative for agitation, confusion and hallucinations.       OBJECTIVE:    Vitals:    11/07/19 0920   BP: 140/88   Pulse: 57   Resp: 16   Temp: 98 °F (36.7 °C)   Weight: 65.4 kg (144 lb 3.2 oz)   Height: 172.7 cm (68\")   PainSc: 0-No pain       ECOG  (0) Fully active, able to carry on all predisease performance without restriction    Physical Exam   Constitutional: He is oriented to person, place, and time. No distress.   HENT:   Head: Normocephalic and atraumatic.   Eyes: Conjunctivae and EOM are normal. Right eye exhibits no discharge. Left eye exhibits no discharge. No scleral icterus.   Neck: Normal range of motion. Neck supple. No thyromegaly present.   Cardiovascular: Normal rate, regular rhythm and normal heart sounds. Exam reveals no gallop and no friction rub.   Pulmonary/Chest: Effort normal. No stridor. No respiratory distress. He has no wheezes.   Assymetry of bilateral clavicles.   Abdominal: Soft. Bowel sounds are normal. He exhibits no mass. There is no tenderness. There is no rebound and no guarding.   Musculoskeletal: Normal range of motion. He exhibits no tenderness.   Lymphadenopathy:     He has no cervical adenopathy.   Neurological: He is alert and oriented to person, place, and time. He exhibits normal muscle tone.   Skin: Skin is warm. No rash noted. He is not diaphoretic. No erythema.   Psychiatric: He has a normal mood and affect. His behavior is normal.   Nursing note and vitals reviewed.      RECENT LABS  WBC   Date Value Ref Range Status   11/07/2019 4.27 3.40 - 10.80 10*3/mm3 Final     RBC   Date Value Ref Range Status   11/07/2019 5.39 4.14 - 5.80 10*6/mm3 Final   05/28/2019 5.48 4.20 - 5.80 Million/uL Final     Hemoglobin   Date Value Ref Range Status   11/07/2019 14.4 13.0 - 17.7 g/dL Final     Hematocrit   Date Value Ref Range Status   11/07/2019 42.3 37.5 - 51.0 % Final     MCV   Date Value Ref Range Status   11/07/2019 " 78.5 (L) 79.0 - 97.0 fL Final     MCH   Date Value Ref Range Status   11/07/2019 26.7 26.6 - 33.0 pg Final     MCHC   Date Value Ref Range Status   11/07/2019 34.0 31.5 - 35.7 g/dL Final     RDW   Date Value Ref Range Status   11/07/2019 13.4 12.3 - 15.4 % Final     RDW-SD   Date Value Ref Range Status   11/07/2019 38.0 37.0 - 54.0 fl Final     MPV   Date Value Ref Range Status   11/07/2019 10.7 6.0 - 12.0 fL Final     Platelets   Date Value Ref Range Status   11/07/2019 168 140 - 450 10*3/mm3 Final     Neutrophil %   Date Value Ref Range Status   11/07/2019 36.1 (L) 42.7 - 76.0 % Final     Lymphocyte %   Date Value Ref Range Status   11/07/2019 50.6 (H) 19.6 - 45.3 % Final     Monocyte %   Date Value Ref Range Status   11/07/2019 11.9 5.0 - 12.0 % Final     Eosinophil %   Date Value Ref Range Status   11/07/2019 1.2 0.3 - 6.2 % Final     Basophil %   Date Value Ref Range Status   11/07/2019 0.2 0.0 - 1.5 % Final     Neutrophils, Absolute   Date Value Ref Range Status   11/07/2019 1.54 (L) 1.70 - 7.00 10*3/mm3 Final     Lymphocytes, Absolute   Date Value Ref Range Status   11/07/2019 2.16 0.70 - 3.10 10*3/mm3 Final     Monocytes, Absolute   Date Value Ref Range Status   11/07/2019 0.51 0.10 - 0.90 10*3/mm3 Final     Eosinophils, Absolute   Date Value Ref Range Status   11/07/2019 0.05 0.00 - 0.40 10*3/mm3 Final     Basophils, Absolute   Date Value Ref Range Status   11/07/2019 0.01 0.00 - 0.20 10*3/mm3 Final     nRBC   Date Value Ref Range Status   03/22/2019 0 0 /100[WBCs] Final       Lab Results   Component Value Date    GLUCOSE 76 06/25/2019    BUN 6 (L) 06/25/2019    CREATININE 0.90 06/25/2019    EGFRIFNONA 101 05/28/2019    EGFRIFAFRI 118 06/25/2019    BCR 6.7 06/25/2019    K 3.3 (L) 06/25/2019    CO2 26.0 06/25/2019    CALCIUM 9.5 06/25/2019    PROTENTOTREF 8.1 06/25/2019    ALBUMIN 4.80 06/25/2019    ALBUMIN 4.5 (H) 06/25/2019    LABIL2 1.3 06/25/2019    AST 18 06/25/2019    ALT 11 (L) 06/25/2019          Assessment/Plan     Other neutropenia (CMS/[facility])  - CBC & Differential  - CBC Auto Differential      ASSESSMENT:    1.  Intermittent leukopenia .  His work-up has been negative and his repeat CBCs have been essentially unremarkable.  Peripheral smear does not show any evidence of leukoerythroblastic changes.  BRISA was also negative.  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  2. Right supraclavicular fullness of unclear etiology may be lymph node enlargement or mass.  Patient denies history of trauma that can account for the deformity.  3. Dyspnea, chronic. CT Neck and Chest       PLAN:     Patient has leukopenia of unclear etiology.  He reports intermittent decreases in his white count.  Review of available lab data does not show any evidence of leukoerythroblastic changes to suggest a primary bone marrow disease.  We reviewed the possible differential diagnoses including autoimmune, vitamin deficiencies, infectious etiologies included chronic viral infections, and finally primary bone marrow disease is also in the differential such as chronic leukemias.  So far his work-up has been negative.  The above differentials have been ruled out for now.    His CBC today shows mild lymphocytosis, therefore I have recommended peripheral blood flow cytometry today.     He has right supraclavicular fullness of unclear etiology.  He denies any significant trauma in the past that can account for the asymmetry. Would like to further investigate with a CT scan of the neck and Chest CT but patient has declined. We will review the results once available.     I have reviewed labs results, imaging( CT abdomen and pelvis), vitals, and medications with the patient today.          I have reviewed labs results, imaging, vitals, and medications with the patient today. Will follow up in 6 months with me.      Patient verbalized understanding and is in  agreement of the above plan.    Part of this document was scribed by Akiko Daniels RN, BSN.

## 2019-11-07 ENCOUNTER — RESULTS ENCOUNTER (OUTPATIENT)
Dept: ONCOLOGY | Facility: CLINIC | Age: 34
End: 2019-11-07

## 2019-11-07 ENCOUNTER — APPOINTMENT (OUTPATIENT)
Dept: LAB | Facility: HOSPITAL | Age: 34
End: 2019-11-07

## 2019-11-07 ENCOUNTER — OFFICE VISIT (OUTPATIENT)
Dept: ONCOLOGY | Facility: CLINIC | Age: 34
End: 2019-11-07

## 2019-11-07 VITALS
HEIGHT: 68 IN | BODY MASS INDEX: 21.86 KG/M2 | SYSTOLIC BLOOD PRESSURE: 140 MMHG | WEIGHT: 144.2 LBS | DIASTOLIC BLOOD PRESSURE: 88 MMHG | TEMPERATURE: 98 F | RESPIRATION RATE: 16 BRPM | HEART RATE: 57 BPM

## 2019-11-07 DIAGNOSIS — D72.820 LYMPHOCYTOSIS: ICD-10-CM

## 2019-11-07 DIAGNOSIS — D72.820 LYMPHOCYTOSIS: Primary | ICD-10-CM

## 2019-11-07 DIAGNOSIS — D70.8 OTHER NEUTROPENIA (HCC): ICD-10-CM

## 2019-11-07 LAB
BASOPHILS # BLD AUTO: 0.01 10*3/MM3 (ref 0–0.2)
BASOPHILS NFR BLD AUTO: 0.2 % (ref 0–1.5)
DEPRECATED RDW RBC AUTO: 38 FL (ref 37–54)
EOSINOPHIL # BLD AUTO: 0.05 10*3/MM3 (ref 0–0.4)
EOSINOPHIL NFR BLD AUTO: 1.2 % (ref 0.3–6.2)
ERYTHROCYTE [DISTWIDTH] IN BLOOD BY AUTOMATED COUNT: 13.4 % (ref 12.3–15.4)
HCT VFR BLD AUTO: 42.3 % (ref 37.5–51)
HGB BLD-MCNC: 14.4 G/DL (ref 13–17.7)
LYMPHOCYTES # BLD AUTO: 2.16 10*3/MM3 (ref 0.7–3.1)
LYMPHOCYTES NFR BLD AUTO: 50.6 % (ref 19.6–45.3)
MCH RBC QN AUTO: 26.7 PG (ref 26.6–33)
MCHC RBC AUTO-ENTMCNC: 34 G/DL (ref 31.5–35.7)
MCV RBC AUTO: 78.5 FL (ref 79–97)
MONOCYTES # BLD AUTO: 0.51 10*3/MM3 (ref 0.1–0.9)
MONOCYTES NFR BLD AUTO: 11.9 % (ref 5–12)
NEUTROPHILS # BLD AUTO: 1.54 10*3/MM3 (ref 1.7–7)
NEUTROPHILS NFR BLD AUTO: 36.1 % (ref 42.7–76)
PLATELET # BLD AUTO: 168 10*3/MM3 (ref 140–450)
PMV BLD AUTO: 10.7 FL (ref 6–12)
RBC # BLD AUTO: 5.39 10*6/MM3 (ref 4.14–5.8)
WBC NRBC COR # BLD: 4.27 10*3/MM3 (ref 3.4–10.8)

## 2019-11-07 PROCEDURE — 85025 COMPLETE CBC W/AUTO DIFF WBC: CPT | Performed by: INTERNAL MEDICINE

## 2019-11-07 PROCEDURE — 99214 OFFICE O/P EST MOD 30 MIN: CPT | Performed by: INTERNAL MEDICINE

## 2019-11-07 PROCEDURE — 36415 COLL VENOUS BLD VENIPUNCTURE: CPT | Performed by: INTERNAL MEDICINE

## 2019-11-19 PROBLEM — I10 HYPERTENSION: Status: ACTIVE | Noted: 2019-11-19

## 2019-11-19 PROBLEM — D72.819 LEUKOPENIA: Status: ACTIVE | Noted: 2019-11-19

## 2020-04-23 ENCOUNTER — TELEPHONE (OUTPATIENT)
Dept: ONCOLOGY | Facility: CLINIC | Age: 35
End: 2020-04-23

## 2020-05-05 ENCOUNTER — TELEPHONE (OUTPATIENT)
Dept: ONCOLOGY | Facility: CLINIC | Age: 35
End: 2020-05-05

## 2020-05-05 NOTE — TELEPHONE ENCOUNTER
Spoke to patient's wife and got his appointment changed to next week for a telephone visit. Canceled lab appointment until further notice as patient's wife was just tested for Covid19 today and is isolation until results come back.

## 2020-05-05 NOTE — TELEPHONE ENCOUNTER
Patient's wife, Anisa, returning call to office.  Appointment note says that 5/7 appointment needs to be rescheduled.    Patient works nights and early morning appointment would be good for him (around 9:00).    Please call Anisa to reschedule -[phone]

## 2020-05-07 ENCOUNTER — APPOINTMENT (OUTPATIENT)
Dept: LAB | Facility: HOSPITAL | Age: 35
End: 2020-05-07

## 2020-05-13 ENCOUNTER — OFFICE VISIT (OUTPATIENT)
Dept: ONCOLOGY | Facility: CLINIC | Age: 35
End: 2020-05-13

## 2020-05-13 DIAGNOSIS — D70.8 OTHER NEUTROPENIA (HCC): Primary | ICD-10-CM

## 2020-05-13 PROCEDURE — 99442 PR PHYS/QHP TELEPHONE EVALUATION 11-20 MIN: CPT | Performed by: INTERNAL MEDICINE

## 2020-05-13 NOTE — PROGRESS NOTES
Hematology/Oncology Outpatient Follow Up    PATIENT NAME:Colton Garland  :1985  MRN: [id number]  PRIMARY CARE PHYSICIAN: Matthew Lopez MD  REFERRING PHYSICIAN: Matthew Lopez MD    Chief Complaint   Patient presents with   • Follow-up     leucopenia      You have chosen to receive care through a telephone visit. Do you consent to use a telephone visit for your medical care today? Yes    HISTORY OF PRESENT ILLNESS:   This is a 35-year-old male who has been referred secondary to leukopenia patient is aware of leukopenia on and off for the past 1 year.  He denies any history of repeated infections.  He denies night sweats, weight loss or fatigue symptoms.  He remains fully functional.  He denies any significant pain issues.  He has no prior history of hematologic problems.  He has never received blood transfusions in the past.  There is no family history of hematological issues.  Patient is originally from [place].  He travels back and forth and reports repeated bouts of malaria which each visit.  He denies any fevers or chills muscle aches or pains.  He denies any lumps in any part of his body.  Patient has been in [place] now for 3 [place].     · The patient had a complete metabolic panel 2019 that was normal.  He had a CBC also on 2019 his white count was 3 hemoglobin was 14.4, platelets were 1 85,000.  His differentials with a ANC of 1.244% lymphocytes normal there was no monocytosis eosinophilia or basophilia there was no evidence of leukoerythroblastic changes on his a differential counts.  He had a CBC on 2019 his white count was 4.4, hemoglobin was 15.4, and platelets where 168,000.  His ANC was 1.9 there was no monocytosis is no failure or basophilia.   · 2019 patient had labs including a chemistry panel his potassium was 3.3.  His BUN was 6 and creatinine 0.9.  She had normal liver function tests.  Ferritin was 90, folate was 17 methylmalonic acid level was normal at 90  haptoglobin was low at 30, B12 was 259.  SPEP with PATT did not show any monoclonal protein.  Peripheral smear did not show any immature forms.  His ANC was 2.4, white count 4.3 hemoglobin 13.9, and platelets where 176.  Patient had normal quantitative immunoglobulins for IgA IgG and IgM.  His BRISA was negative.  · 11/7/2019 patient had a CBC white count was 4.7, hemoglobin 14.4 and platelets were 168.  Differentials were to 6% neutrophils, 50% lymphocytes and ANC was 1.54.  · 11/7/2019 patient had flow cytometry which did not show any evidence of lymphoproliferative disease    Past Medical History:   Diagnosis Date   • Chest pain    • Hypertension    • Leukopenia    • Malaria    • Wrist pain        No past surgical history on file.      Current Outpatient Medications:   •  Naproxen Sodium (ALEVE PO), Take  by mouth As Needed., Disp: , Rfl:   •  potassium chloride (K-DUR) 10 MEQ CR tablet, Take 1 tablet by mouth Daily., Disp: 30 tablet, Rfl: 3    No Known Allergies    No family history on file.    Cancer-related family history is not on file.    Social History     Tobacco Use   • Smoking status: Never Smoker   Substance Use Topics   • Alcohol use: No     Frequency: Never   • Drug use: No       I have reviewed and confirmed the accuracy of the patient's history:as entered by the MA/LPN/RN. Isabelle Randall MD 05/13/20     SUBJECTIVE:  The patient is here for a follow up appointment. Doing well.  Denies any fevers or chills. Denies            REVIEW OF SYSTEMS:  Review of Systems   Constitutional: Negative for chills and fever.   HENT: Negative for ear pain, mouth sores, nosebleeds and sore throat.    Eyes: Negative for photophobia and visual disturbance.   Respiratory: Negative for wheezing and stridor.    Cardiovascular: Negative for chest pain and palpitations.   Gastrointestinal: Negative for abdominal pain, diarrhea, nausea and vomiting.   Endocrine: Negative for cold intolerance and heat intolerance.    Genitourinary: Negative for dysuria and hematuria.   Musculoskeletal: Negative for joint swelling and neck stiffness.   Skin: Negative for color change and rash.   Neurological: Negative for seizures and syncope.   Hematological: Negative for adenopathy.        No obvious bleeding   Psychiatric/Behavioral: Negative for agitation, confusion and hallucinations.       OBJECTIVE:    There were no vitals filed for this visit.    ECOG  (0) Fully active, able to carry on all predisease performance without restriction    Physical Exam    RECENT LABS  WBC   Date Value Ref Range Status   11/07/2019 4.27 3.40 - 10.80 10*3/mm3 Final     RBC   Date Value Ref Range Status   11/07/2019 5.39 4.14 - 5.80 10*6/mm3 Final   05/28/2019 5.48 4.20 - 5.80 Million/uL Final     Hemoglobin   Date Value Ref Range Status   11/07/2019 14.4 13.0 - 17.7 g/dL Final     Hematocrit   Date Value Ref Range Status   11/07/2019 42.3 37.5 - 51.0 % Final     MCV   Date Value Ref Range Status   11/07/2019 78.5 (L) 79.0 - 97.0 fL Final     MCH   Date Value Ref Range Status   11/07/2019 26.7 26.6 - 33.0 pg Final     MCHC   Date Value Ref Range Status   11/07/2019 34.0 31.5 - 35.7 g/dL Final     RDW   Date Value Ref Range Status   11/07/2019 13.4 12.3 - 15.4 % Final     RDW-SD   Date Value Ref Range Status   11/07/2019 38.0 37.0 - 54.0 fl Final     MPV   Date Value Ref Range Status   11/07/2019 10.7 6.0 - 12.0 fL Final     Platelets   Date Value Ref Range Status   11/07/2019 168 140 - 450 10*3/mm3 Final     Neutrophil %   Date Value Ref Range Status   11/07/2019 36.1 (L) 42.7 - 76.0 % Final     Lymphocyte %   Date Value Ref Range Status   11/07/2019 50.6 (H) 19.6 - 45.3 % Final     Monocyte %   Date Value Ref Range Status   11/07/2019 11.9 5.0 - 12.0 % Final     Eosinophil %   Date Value Ref Range Status   11/07/2019 1.2 0.3 - 6.2 % Final     Basophil %   Date Value Ref Range Status   11/07/2019 0.2 0.0 - 1.5 % Final     Neutrophils, Absolute   Date Value  Ref Range Status   11/07/2019 1.54 (L) 1.70 - 7.00 10*3/mm3 Final     Lymphocytes, Absolute   Date Value Ref Range Status   11/07/2019 2.16 0.70 - 3.10 10*3/mm3 Final     Monocytes, Absolute   Date Value Ref Range Status   11/07/2019 0.51 0.10 - 0.90 10*3/mm3 Final     Eosinophils, Absolute   Date Value Ref Range Status   11/07/2019 0.05 0.00 - 0.40 10*3/mm3 Final     Basophils, Absolute   Date Value Ref Range Status   11/07/2019 0.01 0.00 - 0.20 10*3/mm3 Final     nRBC   Date Value Ref Range Status   03/22/2019 0 0 /100[WBCs] Final       Lab Results   Component Value Date    GLUCOSE 76 06/25/2019    BUN 6 (L) 06/25/2019    CREATININE 0.90 06/25/2019    EGFRIFNONA 101 05/28/2019    EGFRIFAFRI 118 06/25/2019    BCR 6.7 06/25/2019    K 3.3 (L) 06/25/2019    CO2 26.0 06/25/2019    CALCIUM 9.5 06/25/2019    PROTENTOTREF 8.1 06/25/2019    ALBUMIN 4.80 06/25/2019    ALBUMIN 4.5 (H) 06/25/2019    LABIL2 1.3 06/25/2019    AST 18 06/25/2019    ALT 11 (L) 06/25/2019         Assessment/Plan     There are no diagnoses linked to this encounter.    ASSESSMENT:    1.  Intermittent leukopenia .  His work-up has been negative and his repeat CBCs have been essentially unremarkable.  Peripheral smear does not show any evidence of leukoerythroblastic changes.  BRISA was also negative.  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  2. Mild lymphocytosis: Flow cytometry was negative  3. Right supraclavicular fullness of unclear etiology may be lymph node enlargement or mass.  Patient denies history of trauma that can account for the deformity.  4. Dyspnea, chronic. CT Neck and Chest recommended but patient declines       PLAN:     Patient has leukopenia of unclear etiology.  He reports intermittent decreases in his white count.  Review of available lab data does not show any evidence of leukoerythroblastic changes to suggest a primary bone marrow disease.  We  reviewed the possible differential diagnoses including autoimmune, vitamin deficiencies, infectious etiologies included chronic viral infections, and finally primary bone marrow disease is also in the differential such as chronic leukemias.  So far his work-up has been negative.  The above differentials have been ruled out for now.    His CBC today shows mild lymphocytosis, and peripheral blood flow cytometry was negative for lymphoproliferative disease     He has right supraclavicular fullness of unclear etiology.  He denies any significant trauma in the past that can account for the asymmetry. Would like to further investigate with a CT scan of the neck and Chest CT but patient has declined.  He understands my concerns     Plan for CBC today and if within acceptable limits I will see patient again in October 2020.    He will continue to follow-up with his primary care physician         I have reviewed labs results, imaging, vitals, and medications with the patient today. Will follow up in 6 months with me.      Patient verbalized understanding and is in agreement of the above plan.    I spent more than 15 minutes discussing with patient management recommendations, reviewing imaging studies, lab results, progress notes and formulating management decisions.  Time was also spent answering all his/ her questions to the best of my abilities.

## 2020-07-06 ENCOUNTER — TELEPHONE (OUTPATIENT)
Dept: ONCOLOGY | Facility: CLINIC | Age: 35
End: 2020-07-06

## 2020-07-06 NOTE — TELEPHONE ENCOUNTER
Spoke with patient's wife who stated that patient works 3rd shift.  She will give him the message to call office and schedule 6 month f/u for November.

## 2020-10-05 ENCOUNTER — TELEPHONE (OUTPATIENT)
Dept: ONCOLOGY | Facility: CLINIC | Age: 35
End: 2020-10-05

## 2020-10-05 NOTE — TELEPHONE ENCOUNTER
Anisa, patient's wife, calling.    Please call to schedule follow up appointment.  He will be leaving the country within the next 2-3 weeks.  He thought he just needed labwork, but it looks like Dr. Randall wanted to see him in October according to her last note.    [phone]

## 2020-10-06 NOTE — TELEPHONE ENCOUNTER
LM ASKING PT OR HIS WIFE TO CALL TO SCHEDULE FOLLOW UP WITH MAO AND HAVE HIS LABS CHECKED NEXT WEEK

## 2020-10-06 NOTE — TELEPHONE ENCOUNTER
Dr. Randall you last saw him in May for Leukopenia. Do you want to see him before he leaves the country?

## 2020-10-08 ENCOUNTER — TELEPHONE (OUTPATIENT)
Dept: ONCOLOGY | Facility: CLINIC | Age: 35
End: 2020-10-08

## 2020-10-08 NOTE — TELEPHONE ENCOUNTER
Called and left a VM letting them know we need to setup a follow up with [facility] before he goes out of town.

## 2023-01-07 ENCOUNTER — HOSPITAL ENCOUNTER (EMERGENCY)
Facility: HOSPITAL | Age: 38
Discharge: HOME | End: 2023-01-07
Attending: EMERGENCY MEDICINE | Admitting: EMERGENCY MEDICINE

## 2023-01-07 VITALS
DIASTOLIC BLOOD PRESSURE: 81 MMHG | TEMPERATURE: 97 F | OXYGEN SATURATION: 99 % | HEART RATE: 75 BPM | RESPIRATION RATE: 17 BRPM | SYSTOLIC BLOOD PRESSURE: 140 MMHG

## 2023-01-07 DIAGNOSIS — R10.2 ACUTE SUPRAPUBIC PAIN: Primary | ICD-10-CM

## 2023-01-07 LAB
ANION GAP SERPL CALCULATED.3IONS-SCNC: 10.6 MMOL/L (ref 5–15)
BACTERIA UR QL AUTO: NORMAL /HPF
BASOPHILS # BLD AUTO: 0.03 10*3/MM3 (ref 0–0.2)
BASOPHILS NFR BLD AUTO: 0.4 % (ref 0–1.5)
BILIRUB UR QL STRIP: NEGATIVE
BUN SERPL-MCNC: 9 MG/DL (ref 6–20)
BUN/CREAT SERPL: 9.8 (ref 7–25)
CALCIUM SPEC-SCNC: 9.5 MG/DL (ref 8.6–10.5)
CHLORIDE SERPL-SCNC: 99 MMOL/L (ref 98–107)
CLARITY UR: CLEAR
CO2 SERPL-SCNC: 28.4 MMOL/L (ref 22–29)
COLOR UR: YELLOW
CREAT SERPL-MCNC: 0.92 MG/DL (ref 0.76–1.27)
DEPRECATED RDW RBC AUTO: 39.4 FL (ref 37–54)
EGFRCR SERPLBLD CKD-EPI 2021: 110.6 ML/MIN/1.73
EOSINOPHIL # BLD AUTO: 0.03 10*3/MM3 (ref 0–0.4)
EOSINOPHIL NFR BLD AUTO: 0.4 % (ref 0.3–6.2)
ERYTHROCYTE [DISTWIDTH] IN BLOOD BY AUTOMATED COUNT: 13.5 % (ref 12.3–15.4)
GLUCOSE SERPL-MCNC: 101 MG/DL (ref 65–99)
GLUCOSE UR STRIP-MCNC: NEGATIVE MG/DL
HCT VFR BLD AUTO: 43.9 % (ref 37.5–51)
HGB BLD-MCNC: 14.1 G/DL (ref 13–17.7)
HGB UR QL STRIP.AUTO: NEGATIVE
HYALINE CASTS UR QL AUTO: NORMAL /LPF
IMM GRANULOCYTES # BLD AUTO: 0.01 10*3/MM3 (ref 0–0.05)
IMM GRANULOCYTES NFR BLD AUTO: 0.1 % (ref 0–0.5)
KETONES UR QL STRIP: ABNORMAL
LEUKOCYTE ESTERASE UR QL STRIP.AUTO: NEGATIVE
LYMPHOCYTES # BLD AUTO: 1.99 10*3/MM3 (ref 0.7–3.1)
LYMPHOCYTES NFR BLD AUTO: 25 % (ref 19.6–45.3)
MCH RBC QN AUTO: 26.2 PG (ref 26.6–33)
MCHC RBC AUTO-ENTMCNC: 32.1 G/DL (ref 31.5–35.7)
MCV RBC AUTO: 81.4 FL (ref 79–97)
MONOCYTES # BLD AUTO: 0.71 10*3/MM3 (ref 0.1–0.9)
MONOCYTES NFR BLD AUTO: 8.9 % (ref 5–12)
NEUTROPHILS NFR BLD AUTO: 5.18 10*3/MM3 (ref 1.7–7)
NEUTROPHILS NFR BLD AUTO: 65.2 % (ref 42.7–76)
NITRITE UR QL STRIP: NEGATIVE
NRBC BLD AUTO-RTO: 0 /100 WBC (ref 0–0.2)
PH UR STRIP.AUTO: 5.5 [PH] (ref 5–8)
PLATELET # BLD AUTO: 217 10*3/MM3 (ref 140–450)
PMV BLD AUTO: 10.3 FL (ref 6–12)
POTASSIUM SERPL-SCNC: 3.4 MMOL/L (ref 3.5–5.2)
PROT UR QL STRIP: ABNORMAL
RBC # BLD AUTO: 5.39 10*6/MM3 (ref 4.14–5.8)
RBC # UR STRIP: NORMAL /HPF
REF LAB TEST METHOD: NORMAL
SODIUM SERPL-SCNC: 138 MMOL/L (ref 136–145)
SP GR UR STRIP: 1.03 (ref 1–1.03)
SQUAMOUS #/AREA URNS HPF: NORMAL /HPF
UROBILINOGEN UR QL STRIP: ABNORMAL
WBC # UR STRIP: NORMAL /HPF
WBC NRBC COR # BLD: 7.95 10*3/MM3 (ref 3.4–10.8)

## 2023-01-07 PROCEDURE — 80048 BASIC METABOLIC PNL TOTAL CA: CPT | Performed by: EMERGENCY MEDICINE

## 2023-01-07 PROCEDURE — 36415 COLL VENOUS BLD VENIPUNCTURE: CPT

## 2023-01-07 PROCEDURE — 85025 COMPLETE CBC W/AUTO DIFF WBC: CPT | Performed by: EMERGENCY MEDICINE

## 2023-01-07 PROCEDURE — 81001 URINALYSIS AUTO W/SCOPE: CPT | Performed by: EMERGENCY MEDICINE

## 2023-01-07 PROCEDURE — 99283 EMERGENCY DEPT VISIT LOW MDM: CPT

## 2023-01-07 PROCEDURE — 51798 US URINE CAPACITY MEASURE: CPT

## 2023-01-07 NOTE — DISCHARGE INSTRUCTIONS
Call the urologist on Monday for follow-up.    The primary care office will call you in 1 to 2 days to schedule an appointment with primary care.  This is important to do given your medical history.  Follow-up with them as well.    Return here immediately for any fever over 100.4, blood in your urine, worsened abdominal pain, uncontrolled nausea or vomiting.

## 2023-01-07 NOTE — ED PROVIDER NOTES
EMERGENCY DEPARTMENT ENCOUNTER    Room Number:  09/09  Date of encounter:  1/7/2023  PCP: Provider, No Known  Patient Care Team:  Provider, No Known as PCP - General   Independent Historians: Patient    HPI:  Chief Complaint: Suprapubic discomfort  A complete HPI/ROS/PMH/PSH/SH/FH are unobtainable due to: Nothing    Chronic or social conditions impacting patient care (social determinants of health): None    Context: Colton Garland is a 37 y.o. male who presents to the ED c/o suprapubic discomfort since last week.  He reports he has had frequent urination.  He denies any prior similar episodes.  He denies any dysuria.  He denies any hematuria.  He has not had any treatment for his symptoms.  Nothing makes it worse or better.  He denies any pain.  He reports it is an abnormal feeling in his suprapubic region.    Review of prior external notes (non-ED): Oncology note dated 5/13/2020 with leukopenia of unclear etiology.  He was due to follow-up in 2020 for repeat testing.    Review of prior external test results outside of this encounter: Chemistries dated 6/25/2019 were normal.  Blood counts dated 11/17/2019 were normal.  White blood cell count 4.27.    PAST MEDICAL HISTORY  Active Ambulatory Problems     Diagnosis Date Noted   • Other neutropenia (HCC) 06/25/2019   • Supraclavicular fossa fullness 06/25/2019   • Shortness of breath 06/25/2019   • Hypertension 11/19/2019   • Leukopenia 11/19/2019     Resolved Ambulatory Problems     Diagnosis Date Noted   • No Resolved Ambulatory Problems     Past Medical History:   Diagnosis Date   • Chest pain    • Malaria    • Wrist pain        The patient qualifies to receive the vaccine, but they have not yet received it.    PAST SURGICAL HISTORY  No past surgical history on file.      FAMILY HISTORY  No family history on file.      SOCIAL HISTORY  Social History     Socioeconomic History   • Marital status:    Tobacco Use   • Smoking status: Never   Substance and Sexual  Activity   • Alcohol use: No   • Drug use: No         ALLERGIES  Patient has no known allergies.        REVIEW OF SYSTEMS  Review of Systems   No chest pain, no shortness of breath, no abdominal pain, no fever, no chills, no headache, no dysuria, no hematuria, no urethral discharge  All systems reviewed and negative except for those discussed in HPI.       PHYSICAL EXAM    I have reviewed the triage vital signs and nursing notes.    ED Triage Vitals   Temp Heart Rate Resp BP SpO2   01/07/23 0820 01/07/23 0820 01/07/23 0821 -- 01/07/23 0820   97 °F (36.1 °C) 75 17  99 %      Temp src Heart Rate Source Patient Position BP Location FiO2 (%)   01/07/23 0820 -- -- -- --   Tympanic           Physical Exam  GENERAL: Awake, alert, no acute distress  SKIN: Warm, dry  HENT: Normocephalic, atraumatic  EYES: no scleral icterus  CV: regular rhythm, regular rate  RESPIRATORY: normal effort, lungs clear  ABDOMEN: soft, nontender, nondistended.  Normal male anatomy without testicular tenderness.  No open wounds.  No urethral drainage.  MUSCULOSKELETAL: no deformity  NEURO: alert, moves all extremities, follows commands          LAB RESULTS  Recent Results (from the past 24 hour(s))   Urinalysis With Microscopic If Indicated (No Culture) - Urine, Clean Catch    Collection Time: 01/07/23  8:30 AM    Specimen: Urine, Clean Catch   Result Value Ref Range    Color, UA Yellow Yellow, Straw    Appearance, UA Clear Clear    pH, UA 5.5 5.0 - 8.0    Specific Gravity, UA 1.026 1.005 - 1.030    Glucose, UA Negative Negative    Ketones, UA Trace (A) Negative    Bilirubin, UA Negative Negative    Blood, UA Negative Negative    Protein, UA 30 mg/dL (1+) (A) Negative    Leuk Esterase, UA Negative Negative    Nitrite, UA Negative Negative    Urobilinogen, UA 0.2 E.U./dL 0.2 - 1.0 E.U./dL   Urinalysis, Microscopic Only - Urine, Clean Catch    Collection Time: 01/07/23  8:30 AM    Specimen: Urine, Clean Catch   Result Value Ref Range    RBC, UA 0-2  None Seen, 0-2 /HPF    WBC, UA 0-2 None Seen, 0-2 /HPF    Bacteria, UA None Seen None Seen /HPF    Squamous Epithelial Cells, UA 0-2 None Seen, 0-2 /HPF    Hyaline Casts, UA 0-2 None Seen /LPF    Methodology Automated Microscopy    Basic Metabolic Panel    Collection Time: 01/07/23  8:36 AM    Specimen: Blood   Result Value Ref Range    Glucose 101 (H) 65 - 99 mg/dL    BUN 9 6 - 20 mg/dL    Creatinine 0.92 0.76 - 1.27 mg/dL    Sodium 138 136 - 145 mmol/L    Potassium 3.4 (L) 3.5 - 5.2 mmol/L    Chloride 99 98 - 107 mmol/L    CO2 28.4 22.0 - 29.0 mmol/L    Calcium 9.5 8.6 - 10.5 mg/dL    BUN/Creatinine Ratio 9.8 7.0 - 25.0    Anion Gap 10.6 5.0 - 15.0 mmol/L    eGFR 110.6 >60.0 mL/min/1.73   CBC Auto Differential    Collection Time: 01/07/23  8:36 AM    Specimen: Blood   Result Value Ref Range    WBC 7.95 3.40 - 10.80 10*3/mm3    RBC 5.39 4.14 - 5.80 10*6/mm3    Hemoglobin 14.1 13.0 - 17.7 g/dL    Hematocrit 43.9 37.5 - 51.0 %    MCV 81.4 79.0 - 97.0 fL    MCH 26.2 (L) 26.6 - 33.0 pg    MCHC 32.1 31.5 - 35.7 g/dL    RDW 13.5 12.3 - 15.4 %    RDW-SD 39.4 37.0 - 54.0 fl    MPV 10.3 6.0 - 12.0 fL    Platelets 217 140 - 450 10*3/mm3    Neutrophil % 65.2 42.7 - 76.0 %    Lymphocyte % 25.0 19.6 - 45.3 %    Monocyte % 8.9 5.0 - 12.0 %    Eosinophil % 0.4 0.3 - 6.2 %    Basophil % 0.4 0.0 - 1.5 %    Immature Grans % 0.1 0.0 - 0.5 %    Neutrophils, Absolute 5.18 1.70 - 7.00 10*3/mm3    Lymphocytes, Absolute 1.99 0.70 - 3.10 10*3/mm3    Monocytes, Absolute 0.71 0.10 - 0.90 10*3/mm3    Eosinophils, Absolute 0.03 0.00 - 0.40 10*3/mm3    Basophils, Absolute 0.03 0.00 - 0.20 10*3/mm3    Immature Grans, Absolute 0.01 0.00 - 0.05 10*3/mm3    nRBC 0.0 0.0 - 0.2 /100 WBC       Ordered the above labs and independently reviewed the results.        RADIOLOGY  No Radiology Exams Resulted Within Past 24 Hours    I ordered the above noted radiological studies. Reviewed by me and discussed with radiologist.  See dictation for official  radiology interpretation.      PROCEDURES    Procedures      MEDICATIONS GIVEN IN ER    Medications - No data to display      PROGRESS, DATA ANALYSIS, CONSULTS, AND MEDICAL DECISION MAKING    All labs have been independently reviewed by me.  All radiology studies have been reviewed by me and discussed with radiologist dictating the report.   EKG's independently viewed and interpreted by me.  Discussion below represents my analysis of pertinent findings related to patient's condition, differential diagnosis, treatment plan and final disposition.    Differential diagnosis includes but is not limited to urinary retention, STD, UTI, diabetes.    ED Course as of 01/07/23 0924   Sat Jan 07, 2023   0850 WBC: 7.95 [TR]   0850 Bacteria, UA: None Seen [TR]   0850 WBC, UA: 0-2 [TR]   0850 RBC, UA: 0-2 [TR]   0850 Glucose: Negative [TR]   0854 Urinalysis shows no evidence of infection or blood in the urine to suggest kidney stones.  Blood counts are normal.  Pending chemistries and plan to do a bladder scan to rule out any urinary retention. [TR]   0902 Bladder scan is 24 cc post void.  No evidence of urinary retention. [TR]   0919 I reviewed the work-up and findings with the patient at the bedside.  He has no signs of UTI.  He has no signs of kidney stone.  He has no signs of prostatitis.  His testicular exam is normal.  Plan to discharge him home with urology follow-up.  He is agreeable. [TR]   0922 The patient reports that he felt an abnormality on his left testicle the other day.  He cannot find it today.  I cannot find any abnormality on exam.  He has no tenderness to his left testicle.  Plan urology follow-up for his constellation of complaints. [TR]   0923 I will also refer him to primary care as he currently does not have a primary care physician and given his medical history he needs primary care maintenance. [TR]   0923 I considered performing a CT scan of the abdomen pelvis on this patient however he has no  hematuria, no blood leukocytosis.  My current feeling is that this would be low yield as far as achieving a final diagnosis. [TR]      ED Course User Index  [TR] Venkat Centeno MD           PPE: The patient wore a mask and I wore an N95 mask throughout the entire patient encounter.       AS OF 09:24 EST VITALS:    BP - 140/81  HR - 75  TEMP - 97 °F (36.1 °C) (Tympanic)  O2 SATS - 99%        DIAGNOSIS  Final diagnoses:   Acute suprapubic pain         DISPOSITION  ED Disposition     ED Disposition   Discharge    Condition   Stable    Comment   --                Note Disclaimer: At [facility], we believe that sharing information builds trust and better relationships. You are receiving this note because you recently visited [facility]. It is possible you will see health information before a provider has talked with you about it. This kind of information can be easy to misunderstand. To help you fully understand what it means for your health, we urge you to discuss this note with your provider.       Venkat Centeno MD  01/07/23 [id number]

## 2023-01-07 NOTE — ED TRIAGE NOTES
"CO frequent urination and \"weird feeling\" in lower abd    Mask placed on patient in triage. Triage staff wore appropriate PPE during interaction with patient.     "